# Patient Record
Sex: MALE | Race: WHITE
[De-identification: names, ages, dates, MRNs, and addresses within clinical notes are randomized per-mention and may not be internally consistent; named-entity substitution may affect disease eponyms.]

---
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
## 2020-01-16 ENCOUNTER — RECORD ABSTRACTING (OUTPATIENT)
Age: 20
End: 2020-01-16

## 2020-01-16 RX ORDER — MOMETASONE FUROATE MONOHYDRATE 50 UG/1
50 SPRAY, METERED NASAL
Refills: 0 | Status: ACTIVE | COMMUNITY

## 2020-07-15 ENCOUNTER — APPOINTMENT (OUTPATIENT)
Dept: PEDIATRICS | Facility: CLINIC | Age: 20
End: 2020-07-15
Payer: COMMERCIAL

## 2020-07-15 VITALS
SYSTOLIC BLOOD PRESSURE: 163 MMHG | HEART RATE: 96 BPM | BODY MASS INDEX: 20.41 KG/M2 | DIASTOLIC BLOOD PRESSURE: 70 MMHG | TEMPERATURE: 97.8 F | WEIGHT: 159.06 LBS | HEIGHT: 74 IN

## 2020-07-15 DIAGNOSIS — Z00.00 ENCOUNTER FOR GENERAL ADULT MEDICAL EXAMINATION W/OUT ABNORMAL FINDINGS: ICD-10-CM

## 2020-07-15 DIAGNOSIS — Z23 ENCOUNTER FOR IMMUNIZATION: ICD-10-CM

## 2020-07-15 DIAGNOSIS — L70.9 ACNE, UNSPECIFIED: ICD-10-CM

## 2020-07-15 LAB
BILIRUB UR QL STRIP: NEGATIVE
CLARITY UR: CLEAR
COLLECTION METHOD: NORMAL
GLUCOSE UR-MCNC: NEGATIVE
HCG UR QL: 0.2 EU/DL
HGB UR QL STRIP.AUTO: NEGATIVE
KETONES UR-MCNC: NEGATIVE
LEUKOCYTE ESTERASE UR QL STRIP: NEGATIVE
NITRITE UR QL STRIP: NEGATIVE
PH UR STRIP: 7
PROT UR STRIP-MCNC: NEGATIVE
SP GR UR STRIP: 1.01

## 2020-07-15 PROCEDURE — 99395 PREV VISIT EST AGE 18-39: CPT | Mod: 25

## 2020-07-15 PROCEDURE — 96127 BRIEF EMOTIONAL/BEHAV ASSMT: CPT

## 2020-07-15 PROCEDURE — 90651 9VHPV VACCINE 2/3 DOSE IM: CPT

## 2020-07-15 PROCEDURE — 96160 PT-FOCUSED HLTH RISK ASSMT: CPT | Mod: 59

## 2020-07-15 PROCEDURE — 81003 URINALYSIS AUTO W/O SCOPE: CPT | Mod: QW

## 2020-07-15 PROCEDURE — 99173 VISUAL ACUITY SCREEN: CPT | Mod: 59

## 2020-07-15 PROCEDURE — 90471 IMMUNIZATION ADMIN: CPT

## 2020-07-15 RX ORDER — BENZOYL PEROXIDE 5 G/100G
5 GEL TOPICAL DAILY
Qty: 1 | Refills: 3 | Status: ACTIVE | COMMUNITY
Start: 2020-07-15 | End: 1900-01-01

## 2020-07-15 NOTE — HISTORY OF PRESENT ILLNESS
[Up to date] : Up to date [Eats meals with family] : eats meals with family [Has family members/adults to turn to for help] : has family members/adults to turn to for help [Is permitted and is able to make independent decisions] : Is permitted and is able to make independent decisions [Grade: ____] : Grade: [unfilled] [Normal Performance] : normal performance [Normal Behavior/Attention] : normal behavior/attention [Normal Homework] : normal homework [Eats regular meals including adequate fruits and vegetables] : eats regular meals including adequate fruits and vegetables [Drinks non-sweetened liquids] : drinks non-sweetened liquids  [Calcium source] : calcium source [Has friends] : has friends [At least 1 hour of physical activity a day] : at least 1 hour of physical activity a day [Uses drugs] : uses drugs  [Exposure to drugs] : exposure to drugs [Drinks alcohol] : drinks alcohol [Exposure to alcohol] : exposure to alcohol [No] : No cigarette smoke exposure [Uses safety belts/safety equipment] : uses safety belts/safety equipment  [Has peer relationships free of violence] : has peer relationships free of violence [HIV Screening Declined] : HIV Screening Declined [Yes] : Patient has had sexual intercourse. [Has ways to cope with stress] : has ways to cope with stress [Displays self-confidence] : displays self-confidence [With Teen] : teen [Sleep Concerns] : no sleep concerns [Has concerns about body or appearance] : does not have concerns about body or appearance [Screen time (except homework) less than 2 hours a day] : no screen time (except homework) less than 2 hours a day [Has interests/participates in community activities/volunteers] : does not have interests/participates in community activities/volunteers [Uses tobacco] : does not use tobacco [Uses electronic nicotine delivery system] : does not use electronic nicotine delivery system [Exposure to electronic nicotine delivery system] : no exposure to electronic nicotine delivery system [Exposure to tobacco] : no exposure to tobacco [Gets depressed, anxious, or irritable/has mood swings] : does not get depressed, anxious, or irritable/has mood swings [Has problems with sleep] : does not have problems with sleep [de-identified] : COUNSELLED ABOUT MARJUUANA USE AND ALCHOL [Has thought about hurting self or considered suicide] : has not thought about hurting self or considered suicide [de-identified] : ALONE [FreeTextEntry1] : WELL VISIT 19 YEAR OLD

## 2020-07-15 NOTE — PHYSICAL EXAM
[Normocephalic] : normocephalic [No Acute Distress] : no acute distress [Alert] : alert [EOMI Bilateral] : EOMI bilateral [Clear tympanic membranes with bony landmarks and light reflex present bilaterally] : clear tympanic membranes with bony landmarks and light reflex present bilaterally  [Pink Nasal Mucosa] : pink nasal mucosa [Supple, full passive range of motion] : supple, full passive range of motion [No Palpable Masses] : no palpable masses [Nonerythematous Oropharynx] : nonerythematous oropharynx [Normal S1, S2 audible] : normal S1, S2 audible [Clear to Auscultation Bilaterally] : clear to auscultation bilaterally [Regular Rate and Rhythm] : regular rate and rhythm [+2 Femoral Pulses] : +2 femoral pulses [No Murmurs] : no murmurs [Soft] : soft [Non Distended] : non distended [NonTender] : non tender [No Hepatomegaly] : no hepatomegaly [No Splenomegaly] : no splenomegaly [Normoactive Bowel Sounds] : normoactive bowel sounds [No Abnormal Lymph Nodes Palpated] : no abnormal lymph nodes palpated [Nilay: _____] : Nilay [unfilled] [Normal Muscle Tone] : normal muscle tone [No Gait Asymmetry] : no gait asymmetry [No pain or deformities with palpation of bone, muscles, joints] : no pain or deformities with palpation of bone, muscles, joints [Straight] : straight [+2 Patella DTR] : +2 patella DTR [Cranial Nerves Grossly Intact] : cranial nerves grossly intact [No Rash or Lesions] : no rash or lesions

## 2020-07-15 NOTE — DISCUSSION/SUMMARY
[] : The components of the vaccine(s) to be administered today are listed in the plan of care. The disease(s) for which the vaccine(s) are intended to prevent and the risks have been discussed with the caretaker.  The risks are also included in the appropriate vaccination information statements which have been provided to the patient's caregiver.  The caregiver has given consent to vaccinate. [FreeTextEntry1] : Healthy 18 year old/young adult\par Discussed safety/anticipatory guidance\par Discussed avoiding risk taking behavior\par Discussed healthy lifestyle habits\par Reviewed immunization forecast and discussed need for any vaccines, reviewed side effects and VIS\par Discussed need for routine health maintenance and establishing relationship with adult provider\par Discussed need for routine SHAMEKA and gave appropriate handout\par Follow-up: 1 year with adult provider\par \par Discussed and/or provided information on the following:\par PHYSICAL GROWTH AND DEVELOPMENT: Physical and oral health, body image, healthy eating, physical activity\par SOCIAL AND ACADEMIC COMPENTENCE: Connectedness with family, peers, and community; interpersonal relationships; school performance\par EMOTIONAL WELL-BEING: Coping, mood regulation and mental health (depression), sexuality\par RISK REDUCTION: Tobacco, alcohol, or other drugs; pregnancy; STIs, Advice about unprotected sex/birth control\par VIOLENCE AND INJURY PREVENTION: Safety belt and helmet use, driving (graduated license) and substance abuse, guns, interpersonal violence (dating violence), bullying\par PHYSICAL ACTIVITY: Adequate physical activity in organized sports, after-school programs, fun activities; limits on screen time\par

## 2021-01-19 ENCOUNTER — TRANSCRIPTION ENCOUNTER (OUTPATIENT)
Age: 21
End: 2021-01-19

## 2021-01-20 ENCOUNTER — TRANSCRIPTION ENCOUNTER (OUTPATIENT)
Age: 21
End: 2021-01-20

## 2021-06-08 ENCOUNTER — APPOINTMENT (OUTPATIENT)
Dept: PEDIATRICS | Facility: CLINIC | Age: 21
End: 2021-06-08
Payer: COMMERCIAL

## 2021-06-08 VITALS
DIASTOLIC BLOOD PRESSURE: 85 MMHG | HEART RATE: 109 BPM | SYSTOLIC BLOOD PRESSURE: 153 MMHG | TEMPERATURE: 97.2 F | WEIGHT: 169.13 LBS

## 2021-06-08 DIAGNOSIS — H61.22 IMPACTED CERUMEN, LEFT EAR: ICD-10-CM

## 2021-06-08 DIAGNOSIS — H66.92 OTITIS MEDIA, UNSPECIFIED, LEFT EAR: ICD-10-CM

## 2021-06-08 PROCEDURE — 99213 OFFICE O/P EST LOW 20 MIN: CPT | Mod: 25

## 2021-06-08 PROCEDURE — 69210 REMOVE IMPACTED EAR WAX UNI: CPT

## 2021-06-08 PROCEDURE — 99072 ADDL SUPL MATRL&STAF TM PHE: CPT

## 2021-06-08 RX ORDER — AMOXICILLIN 875 MG/1
875 TABLET, FILM COATED ORAL
Qty: 14 | Refills: 0 | Status: ACTIVE | COMMUNITY
Start: 2021-06-08 | End: 1900-01-01

## 2021-06-08 NOTE — DISCUSSION/SUMMARY
[FreeTextEntry1] : Reviewed urgent care records\par Cerumen impaction - most removed - still with hard cerumen against TM\par Small are of TM visualized erythematous - begin po abx\par Continue debrox drops\par Recheck 2-3 days

## 2021-06-08 NOTE — HISTORY OF PRESENT ILLNESS
[FreeTextEntry6] : Left ear pain/pressure for last day. seen at urgent care - was given z pack. no fever. eating/drinking well.  [de-identified] : LEFT EAR PAIN X 1 DAY--STARTED A Z-PACK YESTERDAY 6-7-21--GIVEN BY [facility]

## 2021-06-08 NOTE — PHYSICAL EXAM
[Clear] : right tympanic membrane clear [Erythema] : erythema [NL] : warm [FreeTextEntry3] : L cerumen impaction

## 2021-06-10 ENCOUNTER — APPOINTMENT (OUTPATIENT)
Dept: PEDIATRICS | Facility: CLINIC | Age: 21
End: 2021-06-10
Payer: COMMERCIAL

## 2021-06-10 VITALS
HEART RATE: 94 BPM | TEMPERATURE: 98.4 F | DIASTOLIC BLOOD PRESSURE: 91 MMHG | WEIGHT: 170 LBS | SYSTOLIC BLOOD PRESSURE: 137 MMHG

## 2021-06-10 DIAGNOSIS — H66.92 OTITIS MEDIA, UNSPECIFIED, LEFT EAR: ICD-10-CM

## 2021-06-10 PROCEDURE — 99072 ADDL SUPL MATRL&STAF TM PHE: CPT

## 2021-06-10 PROCEDURE — 99213 OFFICE O/P EST LOW 20 MIN: CPT

## 2021-06-10 RX ORDER — AZITHROMYCIN 250 MG/1
250 TABLET, FILM COATED ORAL
Qty: 6 | Refills: 0 | Status: COMPLETED | COMMUNITY
Start: 2021-06-07

## 2021-06-10 RX ORDER — HYPOC ACID/SOD HYPO/NACL/WATER 0.012 %
0.01 SPRAY, NON-AEROSOL (ML) TOPICAL
Qty: 59 | Refills: 0 | Status: COMPLETED | COMMUNITY
Start: 2020-12-23

## 2021-06-10 RX ORDER — SULFACETAMIDE SODIUM 98 MG/G
9.8 LOTION TOPICAL
Qty: 57 | Refills: 0 | Status: COMPLETED | COMMUNITY
Start: 2020-12-23

## 2021-06-10 RX ORDER — AMOXICILLIN 875 MG/1
875 TABLET, FILM COATED ORAL TWICE DAILY
Qty: 6 | Refills: 0 | Status: ACTIVE | COMMUNITY
Start: 2021-06-10 | End: 1900-01-01

## 2021-06-10 RX ORDER — FLUTICASONE PROPIONATE 50 UG/1
50 SPRAY, METERED NASAL DAILY
Qty: 1 | Refills: 1 | Status: ACTIVE | COMMUNITY
Start: 2021-06-10 | End: 1900-01-01

## 2021-06-10 NOTE — DISCUSSION/SUMMARY
[FreeTextEntry1] : Cerumen impaction resolved - may dc debrox\par LOM - complete 10 day course antibiotics\par FLonase daily \par Return in 1 week if no improvement, sooner if worsening

## 2021-06-10 NOTE — HISTORY OF PRESENT ILLNESS
[de-identified] : RECHECK ON LT. EAR INFECTION [FreeTextEntry6] : F/U ear infection, cerumen impaction. has been using debrox drops since last visit, still with pressure, no further pain.

## 2021-06-17 ENCOUNTER — APPOINTMENT (OUTPATIENT)
Dept: PEDIATRICS | Facility: CLINIC | Age: 21
End: 2021-06-17
Payer: COMMERCIAL

## 2021-06-17 VITALS
WEIGHT: 167.06 LBS | SYSTOLIC BLOOD PRESSURE: 124 MMHG | HEART RATE: 102 BPM | DIASTOLIC BLOOD PRESSURE: 84 MMHG | TEMPERATURE: 98.9 F

## 2021-06-17 DIAGNOSIS — H65.90 UNSPECIFIED NONSUPPURATIVE OTITIS MEDIA, UNSPECIFIED EAR: ICD-10-CM

## 2021-06-17 PROCEDURE — 99213 OFFICE O/P EST LOW 20 MIN: CPT

## 2021-06-17 PROCEDURE — 99072 ADDL SUPL MATRL&STAF TM PHE: CPT

## 2021-06-17 NOTE — HISTORY OF PRESENT ILLNESS
[de-identified] : RECHECK EAR INFECTION [FreeTextEntry6] : Follow up ear infection - on last day of po abx, no fruther pain, still feels clogged.  no fever

## 2021-06-17 NOTE — DISCUSSION/SUMMARY
[FreeTextEntry1] : Discussed OME\par Discussed natural expected course and expected resolution\par Discussed how antihistamines/decongestants and even steroids do not typically alter resolution of fluid\par Discussed possibility of decreased hearing while fluid persists\par Consider trial of nasal steroids to assist with ET dysfunction\par Discussed avoidance of known contributors to OM/OME such as cigarette smoke exposure\par Call if no better 1-2 weeks or sooner for increased pain/fever/concerns.\par Recheck if concerns about persistent fluid and/or hearing

## 2021-06-20 ENCOUNTER — TRANSCRIPTION ENCOUNTER (OUTPATIENT)
Age: 21
End: 2021-06-20

## 2024-11-29 ENCOUNTER — NON-APPOINTMENT (OUTPATIENT)
Age: 24
End: 2024-11-29